# Patient Record
Sex: MALE | Race: WHITE
[De-identification: names, ages, dates, MRNs, and addresses within clinical notes are randomized per-mention and may not be internally consistent; named-entity substitution may affect disease eponyms.]

---

## 2017-02-08 NOTE — ER DOCUMENT REPORT
ED Neck/Back Problem





- General


Chief Complaint: Flank Pain


Stated Complaint: FLANK PAIN


Time seen by provider: 03:37


Mode of Arrival: Ambulatory


Information source: Patient


TRAVEL OUTSIDE OF THE [place] IN LAST 30 DAYS: No





- HPI


Patient complains to provider of: Pain, Lower back


Onset: Yesterday


Onset: Gradual


Quality of pain: Achy


Severity: Moderate


Pain Level: 3


Context: Lifting


Recent injury: No


Exacerbated by: Movement of trunk


Relieved by: Nothing


Similar symptoms previously: Yes


Recently seen / treated by doctor: No


Notes: 


Patient is a 46-year-old male presenting to the emergency room complaining of 

"kidney pain", however when I asked him where his pain was he points to the 

lumbar area of his low back, he has no CVA pain or tenderness, he denies any 

injury or trauma, no bowel or bladder dysfunction, no numbness or tingling to 

his groin or extremities, he reports a history of previous low back pain, 

herniated disks, for which he takes hydrocodone 10 mg, which did not seem to 

help his low back pain over the past 2 days, he denies any injury or trauma, 

however he the job he works requires a lot of heavy lifting





- Related Data


Allergies/Adverse Reactions: 


 





carisoprodol [From Soma] Allergy (Verified 03/29/16 07:05)


 


tramadol [Tramadol] Allergy (Verified 03/29/16 07:05)


 











Past Medical History





- General


Information source: Patient





- Social History


Smoking Status: Current Every Day Smoker


Family History: Reviewed & Not Pertinent


Patient has suicidal ideation: No


Patient has homicidal ideation: No





- Past Medical History


Cardiac Medical History: Reports: Hx Coronary Artery Disease, Hx Hypertension


   Denies: Hx Heart Attack


Pulmonary Medical History: Reports: Hx Pneumonia


   Denies: Hx Asthma, Hx Bronchitis, Hx COPD, Hx Tuberculosis


Neurological Medical History: Denies: Hx Cerebrovascular Accident, Hx Seizures


Renal/ Medical History: Denies: Hx Peritoneal Dialysis


Musculoskeltal Medical History: Reports Hx Arthritis


Psychiatric Medical History: Reports: Hx Depression





- Immunizations


Hx Diphtheria, Pertussis, Tetanus Vaccination: Yes





Review of Systems





- Review of Systems


Constitutional: No symptoms reported


EENT: No symptoms reported


Cardiovascular: No symptoms reported


Respiratory: No symptoms reported


Gastrointestinal: No symptoms reported


Genitourinary: No symptoms reported


Male Genitourinary: No symptoms reported


Musculoskeletal: See HPI


Skin: No symptoms reported


Hematologic/Lymphatic: No symptoms reported


Neurological/Psychological: No symptoms reported


-: Yes All other systems reviewed and negative





Physical Exam





- Vital signs


Vitals: 


 











Temp Pulse Resp BP Pulse Ox


 


 98.1 F   102 H  20   155/92 H  95 


 


 02/07/17 23:04  02/07/17 23:04  02/07/17 23:04  02/07/17 23:04  02/07/17 23:04











Interpretation: Normal





- General


General appearance: Appears well, Alert





- HEENT


Head: Normocephalic, Atraumatic


Eyes: Normal


Conjunctiva: Normal


Extraocular movements intact: Yes


Eyelashes: Normal


Pupils: PERRL





- Respiratory


Respiratory status: No respiratory distress





- Cardiovascular


Rhythm: Regular





- Abdominal


Inspection: Normal





- Back


Back: Tender - Tenderness to palpate and paraspinal musculature of lumbar spine





- Extremities


General upper extremity: Normal inspection


General lower extremity: Normal inspection, Normal weight bearing





- Neurological


Neuro grossly intact: Yes


Cognition: Normal


Orientation: AAOx4


Rigo Coma Scale Eye Opening: Spontaneous


Benson Coma Scale Verbal: Oriented


Rigo Coma Scale Motor: Obeys Commands


Benson Coma Scale Total: 15





- Psychological


Associated symptoms: Normal affect, Normal mood





- Skin


Skin Temperature: Warm


Skin Moisture: Dry


Skin Color: Normal





Course





- Re-evaluation


Re-evalutation: 





02/08/17 05:56


Patient's lab findings were discussed with him at bedside which are unremarkable

, symptoms are more consistent with lumbar spine related pain, he was advised 

to continue taking his hydrocodone at home as previously prescribed, he was 

also given a prescription for Motrin 600 mg to take as well, there was 

recommended for patient to perform gentle stretching, use heating packs to low 

back to help with symptomatic relief, follow-up with his primary care provider 

or return if symptoms worsen, patient acknowledges understanding and agreement 

with this plan





- Vital Signs


Vital signs: 


 











Temp Pulse Resp BP Pulse Ox


 


 98.6 F   71   18   130/88 H  96 


 


 02/08/17 03:51  02/08/17 03:51  02/08/17 03:51  02/08/17 03:51  02/08/17 03:51














- Laboratory


Result Diagrams: 


 02/08/17 02:07





 02/08/17 02:07


Laboratory results interpreted by me: 


 











  02/08/17





  00:15


 


Urine Ascorbic Acid  40 H














Discharge





- Discharge


Clinical Impression: 


Low back pain


Qualifiers:


 Chronicity: acute Back pain laterality: bilateral Sciatica presence: without 

sciatica Qualified Code(s): M54.5 - Low back pain





Condition: Stable


Disposition: HOME, SELF-CARE


Instructions:  Low Back Pain ([facility]), Stretching Exercises for the Back ([facility])


Additional Instructions: 


Follow-up with your primary care provider in 2-3 days.  Return to the emergency 

room immediately if symptoms worsen or any additional concerns.  Refrain from 

heavy lifting or strenuous exercise until symptoms are completely resolved.


Prescriptions: 


Ibuprofen [Motrin 600 Mg Tablet] 600 mg PO TID #30 tablet


Forms:  Return to Work

## 2017-02-08 NOTE — ER DOCUMENT REPORT
ED Medical Screen (RME)





- General


Chief Complaint: Flank Pain


Stated Complaint: FLANK PAIN


Time seen by provider: 01:13


Mode of Arrival: Ambulatory


Information source: Patient


Notes: 


46-year-old male presents to ED for bilateral kidney pain.  He states he has a 

history of renal failure 2 years ago.   He states when he had kidney failure at 

the tip him overnight give him fluids and his kidney started working again. He 

states he also has a history of kidney stones last one was last year.














I have greeted and performed a rapid initial assessment of this patient.  A 

comprehensive ED assessment and evaluation of the patient, analysis of test 

results and completion of medical decision making process will be conducted by 

an additional ED providers.


TRAVEL OUTSIDE OF THE [place] IN LAST 30 DAYS: No





- Related Data


Allergies/Adverse Reactions: 


 





carisoprodol [From Soma] Allergy (Verified 03/29/16 07:05)


 


tramadol [Tramadol] Allergy (Verified 03/29/16 07:05)


 











Past Medical History





- Past Medical History


Cardiac Medical History: Reports: Hx Coronary Artery Disease, Hx Hypertension


   Denies: Hx Heart Attack


Pulmonary Medical History: Reports: Hx Pneumonia


   Denies: Hx Asthma, Hx Bronchitis, Hx COPD, Hx Tuberculosis


Neurological Medical History: Denies: Hx Cerebrovascular Accident, Hx Seizures


Renal/ Medical History: Denies: Hx Peritoneal Dialysis


Musculoskeltal Medical History: Reports Hx Arthritis


Psychiatric Medical History: Reports: Hx Depression





- Immunizations


Hx Diphtheria, Pertussis, Tetanus Vaccination: Yes





Physical Exam





- Vital signs


Vitals: 





 











Temp Pulse Resp BP Pulse Ox


 


 98.1 F   102 H  20   155/92 H  95 


 


 02/07/17 23:04  02/07/17 23:04  02/07/17 23:04  02/07/17 23:04  02/07/17 23:04














Course





- Vital Signs


Vital signs: 





 











Temp Pulse Resp BP Pulse Ox


 


 98.1 F   102 H  20   155/92 H  95 


 


 02/07/17 23:04  02/07/17 23:04  02/07/17 23:04  02/07/17 23:04  02/07/17 23:04














- Laboratory


Laboratory results interpreted by me: 





 











  02/08/17





  00:15


 


Urine Ascorbic Acid  40 H

## 2018-09-28 ENCOUNTER — HOSPITAL ENCOUNTER (OUTPATIENT)
Dept: HOSPITAL 62 - RAD | Age: 48
End: 2018-09-28
Attending: PHYSICIAN ASSISTANT
Payer: COMMERCIAL

## 2018-09-28 DIAGNOSIS — S06.2X9D: Primary | ICD-10-CM

## 2018-09-28 DIAGNOSIS — R42: ICD-10-CM

## 2018-09-28 DIAGNOSIS — R51: ICD-10-CM
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
## 2018-09-28 DIAGNOSIS — M25.60: ICD-10-CM

## 2018-09-28 DIAGNOSIS — M25.511: ICD-10-CM

## 2018-09-28 PROCEDURE — 70551 MRI BRAIN STEM W/O DYE: CPT

## 2018-09-28 NOTE — RADIOLOGY REPORT (SQ)
EXAM DESCRIPTION:  MRI HEAD WITHOUT



COMPLETED DATE/TIME:  9/28/2018 1:47 pm



REASON FOR STUDY:  DIFFUSE TBI W/ LOSS OF CONSCIOUSNESS OF UNSP DURATION S06.2X9D  DIFFUSE TBI W LOSS
 OF CONSCIOUSNESS OF UNSP DURATIO R51  HEADACHE R42  DIZZINESS AND GIDDINESS



COMPARISON:  None.



TECHNIQUE:  Multiplanar imaging includes non-contrasted T1, T2, FLAIR, and diffusion with ADC map seq
uences. Images stored on PACS.



LIMITATIONS:  None.



FINDINGS:  ANATOMY: No anomalies. Normal vascular flow voids. Pituitary fossa normal.

CSF SPACES: Normal in size and contour. No hemorrhage.

CEREBRUM: Sulci and gyri normal in size and contour. Normal white matter signal on FLAIR imaging.  No
 evidence of hemorrhage, mass, or extraaxial fluid collection.

POSTERIOR FOSSA: No signal alteration. No hemorrhage. No edema, masses or mass effect.  Internal cristobal
tory canals, cerebello-pontine angles, mastoids normal.

DIFFUSION IMAGING: Negative for acute or sub-acute infarction.

ORBITS: No masses. Globes normal.

PARANASAL  SINUSES: Mucosal nodule in the floor of the right maxillary sinus.

OTHER: No other significant finding.



IMPRESSION:  NORMAL MRI OF THE BRAIN WITHOUT INTRAVENOUS GADOLINIUM CONTRAST.

EVIDENCE OF ACUTE STROKE: NO.



TECHNICAL DOCUMENTATION:  JOB ID:  [id number]

 2011 [employer]- All Rights Reserved



Reading location - IP/workstation name: [email or web address]

## 2018-09-28 NOTE — RADIOLOGY REPORT (SQ)
EXAM DESCRIPTION:  MRI RT UPPER JOINT WITHOUT



COMPLETED DATE/TIME:  9/28/2018 1:47 pm



REASON FOR STUDY:  PAIN IN RIGHT SHOULDER S06.2X9D  DIFFUSE TBI W LOSS OF CONSCIOUSNESS OF UNSP DURAT
IO R51  HEADACHE R42  DIZZINESS AND GIDDINESS



COMPARISON:  None.



TECHNIQUE:  Right shoulder images acquired and stored on PACS. Multiplanar imaging to include fat sen
sitive sequences such as T1, water sensitive sequences such as FST2/STIR, cartilage sensitive sequenc
es such as FSPD/gradient-echo sequences.



LIMITATIONS:  None.



FINDINGS:  BONE MARROW AND CORTEX: No worrisome bone lesions or marrow replacement. No occult fractur
es.

JOINT OR BURSAL EFFUSION: No significant joint or bursal fluid.

GLENO-HUMERAL ARTICULATION: Posterior glenohumeral narrowing.  Prominent posterior glenoid bone cysti
c changes with probable adjacent extraosseous ganglion formation.

ACROMION AND AC JOINT: Mild degenerative arthropathy.  Subacromial space relatively maintained.

ROTATOR CUFF AND INTERVAL: Intrasubstance tear throughout the cuff insertion.  Sparing of bursal and 
articular surface fibers.  No full-thickness breech identified.  No cuff muscle atrophy.

 LABRUM  AND BICEPS LABRAL COMPLEX: There is at least fraying along the biceps anchor undersurface.  
Biceps tendon looks normal.

REMAINDER OF LABRUM AND IGHL : Ill definition, loss of posterior labral tear.

PERIARTICULAR AND ADJACENT SOFT TISSUES: No masses or abnormal nodes.

OTHER: No other significant finding.



IMPRESSION:  1.  Posterior glenoid changes as above.  Mixed bone and soft tissue cystic appearance wi
th irregularity of the posterior glenoid.  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  Patient
 reportedly has some history of seizures, which can predispose to posterior dislocation.  2.  Cuff te
ar.  Portions of this looks high-grade partial but with spurring of articular and bursal surface fibe
rs.  3.  Other findings as above.



TECHNICAL DOCUMENTATION:  JOB ID:  [id number]

 2011 [employer]- All Rights Reserved



Reading location - IP/workstation name: [email or web address]